# Patient Record
Sex: FEMALE | Race: WHITE
[De-identification: names, ages, dates, MRNs, and addresses within clinical notes are randomized per-mention and may not be internally consistent; named-entity substitution may affect disease eponyms.]

---

## 2019-06-13 ENCOUNTER — HOSPITAL ENCOUNTER (OUTPATIENT)
Dept: HOSPITAL 25 - OR | Age: 69
Discharge: HOME | End: 2019-06-13
Attending: PLASTIC SURGERY
Payer: MEDICARE

## 2019-06-13 VITALS — SYSTOLIC BLOOD PRESSURE: 133 MMHG | DIASTOLIC BLOOD PRESSURE: 71 MMHG

## 2019-06-13 DIAGNOSIS — K21.9: ICD-10-CM

## 2019-06-13 DIAGNOSIS — M19.90: ICD-10-CM

## 2019-06-13 DIAGNOSIS — E78.5: ICD-10-CM

## 2019-06-13 DIAGNOSIS — C43.62: Primary | ICD-10-CM

## 2019-06-13 PROCEDURE — 88305 TISSUE EXAM BY PATHOLOGIST: CPT

## 2019-06-23 ENCOUNTER — HOSPITAL ENCOUNTER (EMERGENCY)
Dept: HOSPITAL 25 - UCCORT | Age: 69
Discharge: HOME | End: 2019-06-23
Payer: MEDICARE

## 2019-06-23 VITALS — SYSTOLIC BLOOD PRESSURE: 117 MMHG | DIASTOLIC BLOOD PRESSURE: 53 MMHG

## 2019-06-23 DIAGNOSIS — R05: Primary | ICD-10-CM

## 2019-06-23 DIAGNOSIS — Z87.891: ICD-10-CM

## 2019-06-23 PROCEDURE — G0463 HOSPITAL OUTPT CLINIC VISIT: HCPCS

## 2019-06-23 PROCEDURE — 99212 OFFICE O/P EST SF 10 MIN: CPT

## 2019-06-23 NOTE — UC
General HPI





- HPI Summary


HPI Summary: 





pt reports being ill for a week.


she felt it was a basic cold at the onset; however, now she is feeling much 

worse.


she c/o having fever and chills, cough and brown sputum.


she took IB pta.


she denies cp, sob, wheezing and copd.


she smoked several years ago.





- History of Current Complaint


Chief Complaint: UCGeneralIllness


Stated Complaint: COUGH,CONGESTION (6DAYS)


Time Seen by Provider: 19 13:02


Hx Obtained From: Patient


Timing: Constant


Pain Intensity: 0





- Allergy/Home Medications


Allergies/Adverse Reactions: 


 Allergies











Allergy/AdvReac Type Severity Reaction Status Date / Time


 


Sulfa (Sulfonamide Allergy Severe Rash And Verified 19 12:58





Antibiotics)   Itching  


 


adhesive tape Allergy  Unknown Verified 19 12:58





   Reaction  





   Details  


 


latex Allergy  REDDENED Verified 19 12:58





   AND SORE  





   SKIN  


 


SOLUTION FOR SURGERY- Allergy  SKIN Uncoded 19 12:58





   REDDENED  











Home Medications: 


 Home Medications





Ibuprofen 400 mg PO Q6HR PRN 19 [History Confirmed 19]











PMH/Surg Hx/FS Hx/Imm Hx


GI/ History: Gastroesophageal Reflux


Psychological History: Depression





- Surgical History


Surgical History: Yes


Surgery Procedure, Year, and Place: TONSILLECTOMY .   .  

[place]  [place] [place]





- Family History


Known Family History: Positive: Non-Contributory





- Social History


Alcohol Use: Daily


Alcohol Amount: 1 GLASS OF WINE DAILY


Substance Use Type: None


Smoking Status (MU): Former Smoker


Amount Used/How Often: 1 PPD X 5 YEARS


Have You Smoked in the Last Year: No


When Did the Patient Quit Smoking/Using Tobacco: 





- Immunization History


Most Recent Influenza Vaccination: 


Most Recent Tetanus Shot: UNKNOWN


Most Recent Pneumonia Vaccination: 2014


Vaccination Up to Date: Yes





Review of Systems


All Other Systems Reviewed And Are Negative: Yes


Constitutional: Positive: Fever, Chills


Respiratory: Positive: Cough


Cardiovascular: Negative: Palpitations, Chest Pain





Physical Exam


Triage Information Reviewed: Yes


Appearance: Well-Appearing


Vital Signs: 


 Initial Vital Signs











Temp  99.9 F   19 12:52


 


Pulse  75   19 12:52


 


Resp  16   06/23/19 12:52


 


BP  117/53   19 12:52


 


Pulse Ox  99   19 12:52











Vital Signs Reviewed: Yes


Eyes: Positive: Conjunctiva Clear


Neck: Positive: Supple, Nontender, No Lymphadenopathy


Respiratory: Positive: No respiratory distress, Decreased breath sounds


Cardiovascular: Positive: RRR, No Murmur


Abdomen Description: Positive: Nontender


Musculoskeletal: Positive: ROM Intact, No Edema


Neurological: Positive: Alert


Psychological: Positive: Age Appropriate Behavior


Skin Exam: Normal





Course/Dx





- Differential Dx - Multi-Symptom


Differential Diagnoses: Other - non toxic. not hypoxic. ill x 1 week and now 

abruptly worse with f/c's and purulent sputum thus I am going to tx for a 

presumptive secondary bacterial infection.





- Diagnoses


Provider Diagnosis: 


 Cough








Discharge





- Sign-Out/Discharge


Documenting (check all that apply): Patient Departure


All imaging exams completed and their final reports reviewed: No Studies





- Discharge Plan


Condition: Stable


Disposition: HOME


Prescriptions: 


Amoxicillin/Clavulanate TAB* [Augmentin *] 875 mg PO BID 10 Days #20 tab


Patient Education Materials:  Acute Cough (ED)


Referrals: 


Masood Lantigua MD [Primary Care Provider] - 7 Days





- Billing Disposition and Condition


Condition: STABLE


Disposition: Home





- Attestation Statements


Provider Attestation: 





Per institutional requirements, I have reviewed the chart, however, I was not 

consulted specifically or made aware of this patient by the  midlevel provider.

  I did not personally evaluate, interact with , or disposition  this patient.